# Patient Record
Sex: MALE | URBAN - METROPOLITAN AREA
[De-identification: names, ages, dates, MRNs, and addresses within clinical notes are randomized per-mention and may not be internally consistent; named-entity substitution may affect disease eponyms.]

---

## 2021-06-19 ENCOUNTER — HOSPITAL ENCOUNTER (EMERGENCY)
Dept: EMERGENCY MEDICINE | Facility: HOSPITAL | Age: 21
Discharge: HOME | End: 2021-06-19
Attending: EMERGENCY MEDICINE

## 2021-06-19 DIAGNOSIS — Z48.02 ENCOUNTER FOR REMOVAL OF SUTURES: ICD-10-CM

## 2021-06-19 ASSESSMENT — MIFFLIN-ST. JEOR: SCORE: 2052.26

## 2021-06-26 NOTE — ED PROVIDER NOTES
eMERGENCY dEPARTMENT eNCOUnter        CHIEF COMPLAINT    Chief Complaint   Patient presents with     Suture / Staple Removal       HPI    Mark Ledezma is a 20 y.o. male who presents for suture removal.  Patient had sutures placed 13 days ago to the right hand, right arm, forehead and back.  He reports of wound itch but otherwise unremarkable.  REVIEW OF SYSTEMS    Non-contributory.  No fevers or chills.    PAST MEDICAL HISTORY    No past medical history on file.    SURGICAL HISTORY    No past surgical history on file.    CURRENT MEDICATIONS    [unfilled]    ALLERGIES    Allergies   Allergen Reactions     Amoxicillin Unknown       FAMILY HISTORY    No family history on file.    SOCIAL HISTORY    Social History     Socioeconomic History     Marital status: Single     Spouse name: Not on file     Number of children: Not on file     Years of education: Not on file     Highest education level: Not on file   Occupational History     Not on file   Social Needs     Financial resource strain: Not on file     Food insecurity     Worry: Not on file     Inability: Not on file     Transportation needs     Medical: Not on file     Non-medical: Not on file   Tobacco Use     Smoking status: Not on file   Substance and Sexual Activity     Alcohol use: Not on file     Drug use: Not on file     Sexual activity: Not on file   Lifestyle     Physical activity     Days per week: Not on file     Minutes per session: Not on file     Stress: Not on file   Relationships     Social connections     Talks on phone: Not on file     Gets together: Not on file     Attends Sikhism service: Not on file     Active member of club or organization: Not on file     Attends meetings of clubs or organizations: Not on file     Relationship status: Not on file     Intimate partner violence     Fear of current or ex partner: Not on file     Emotionally abused: Not on file     Physically abused: Not on file     Forced sexual activity: Not on file   Other  "Topics Concern     Not on file   Social History Narrative     Not on file       PHYSICAL EXAM    VITAL SIGNS: /76 (Patient Position: Sitting)   Pulse 87   Temp 98.2  F (36.8  C) (Oral)   Resp 16   Ht 5' 7\" (1.702 m)   Wt (!) 240 lb (108.9 kg)   SpO2 96%   BMI 37.59 kg/m    Constitutional:  Well developed, well nourished, male in no distress  Musculoskeletal: Full range of motion of upper and lower extremity.  Integument:  Well hydrated, no rash.  Well-healed  wounds to the right hand, arm, forehead and back.  Neurologic: Alert and appropriate        ED COURSE & MEDICAL DECISION MAKING    Pertinent Labs & Imaging studies reviewed. (See chart for details)  11:20 AM.  Patient seen and evaluated.  Patient presenting for suture removal.  Patient had sutures placed almost 2 weeks ago.  SPECT of the wounds revealed him to be well approximated.  Some eschar formation.  Sutures removed without difficulties.  Patient tolerated procedure well.    FINAL IMPRESSION    1.  Suture removal  2.       Harry Frias MD  06/19/21 1137    "

## 2021-06-27 ENCOUNTER — HEALTH MAINTENANCE LETTER (OUTPATIENT)
Age: 21
End: 2021-06-27

## 2021-07-06 VITALS — WEIGHT: 240 LBS | BODY MASS INDEX: 37.67 KG/M2 | HEIGHT: 67 IN

## 2021-10-17 ENCOUNTER — HEALTH MAINTENANCE LETTER (OUTPATIENT)
Age: 21
End: 2021-10-17

## 2022-07-24 ENCOUNTER — HEALTH MAINTENANCE LETTER (OUTPATIENT)
Age: 22
End: 2022-07-24

## 2022-10-03 ENCOUNTER — HEALTH MAINTENANCE LETTER (OUTPATIENT)
Age: 22
End: 2022-10-03

## 2023-08-12 ENCOUNTER — HEALTH MAINTENANCE LETTER (OUTPATIENT)
Age: 23
End: 2023-08-12